# Patient Record
Sex: MALE | Race: WHITE | Employment: PART TIME | URBAN - METROPOLITAN AREA
[De-identification: names, ages, dates, MRNs, and addresses within clinical notes are randomized per-mention and may not be internally consistent; named-entity substitution may affect disease eponyms.]

---

## 2017-04-27 ENCOUNTER — HOSPITAL ENCOUNTER (OUTPATIENT)
Dept: ENDOSCOPY | Age: 32
Discharge: HOME | End: 2017-04-27
Attending: INTERNAL MEDICINE | Admitting: INTERNAL MEDICINE

## 2017-04-27 VITALS
RESPIRATION RATE: 16 BRPM | TEMPERATURE: 98.6 F | HEIGHT: 73 IN | DIASTOLIC BLOOD PRESSURE: 83 MMHG | WEIGHT: 174 LBS | HEART RATE: 108 BPM | OXYGEN SATURATION: 95 % | BODY MASS INDEX: 23.06 KG/M2 | SYSTOLIC BLOOD PRESSURE: 113 MMHG

## 2017-04-27 RX ORDER — FENTANYL CITRATE 50 UG/ML
50 INJECTION, SOLUTION INTRAMUSCULAR; INTRAVENOUS EVERY 5 MIN PRN
Status: DISCONTINUED | OUTPATIENT
Start: 2017-04-27 | End: 2017-04-28 | Stop reason: HOSPADM

## 2017-04-27 RX ORDER — ONDANSETRON 2 MG/ML
4 INJECTION INTRAMUSCULAR; INTRAVENOUS
Status: ACTIVE | OUTPATIENT
Start: 2017-04-27 | End: 2017-04-27

## 2017-04-27 RX ORDER — MEPERIDINE HYDROCHLORIDE 25 MG/ML
12.5 INJECTION INTRAMUSCULAR; INTRAVENOUS; SUBCUTANEOUS EVERY 5 MIN PRN
Status: DISCONTINUED | OUTPATIENT
Start: 2017-04-27 | End: 2017-04-28 | Stop reason: HOSPADM

## 2017-04-27 RX ORDER — OXYCODONE HYDROCHLORIDE AND ACETAMINOPHEN 5; 325 MG/1; MG/1
1 TABLET ORAL PRN
Status: ACTIVE | OUTPATIENT
Start: 2017-04-27 | End: 2017-04-27

## 2017-04-27 RX ORDER — SODIUM CHLORIDE 0.9 % (FLUSH) 0.9 %
10 SYRINGE (ML) INJECTION PRN
Status: DISCONTINUED | OUTPATIENT
Start: 2017-04-27 | End: 2017-04-28 | Stop reason: HOSPADM

## 2017-04-27 RX ORDER — MORPHINE SULFATE 2 MG/ML
2 INJECTION, SOLUTION INTRAMUSCULAR; INTRAVENOUS EVERY 5 MIN PRN
Status: DISCONTINUED | OUTPATIENT
Start: 2017-04-27 | End: 2017-04-28 | Stop reason: HOSPADM

## 2017-04-27 RX ORDER — SODIUM CHLORIDE 0.9 % (FLUSH) 0.9 %
10 SYRINGE (ML) INJECTION EVERY 12 HOURS SCHEDULED
Status: DISCONTINUED | OUTPATIENT
Start: 2017-04-27 | End: 2017-04-28 | Stop reason: HOSPADM

## 2017-04-27 RX ORDER — FENTANYL CITRATE 50 UG/ML
25 INJECTION, SOLUTION INTRAMUSCULAR; INTRAVENOUS EVERY 5 MIN PRN
Status: DISCONTINUED | OUTPATIENT
Start: 2017-04-27 | End: 2017-04-28 | Stop reason: HOSPADM

## 2017-04-27 RX ORDER — MORPHINE SULFATE 2 MG/ML
1 INJECTION, SOLUTION INTRAMUSCULAR; INTRAVENOUS EVERY 5 MIN PRN
Status: DISCONTINUED | OUTPATIENT
Start: 2017-04-27 | End: 2017-04-28 | Stop reason: HOSPADM

## 2017-04-27 RX ORDER — SODIUM CHLORIDE 9 MG/ML
INJECTION, SOLUTION INTRAVENOUS CONTINUOUS
Status: DISCONTINUED | OUTPATIENT
Start: 2017-04-27 | End: 2017-04-28 | Stop reason: HOSPADM

## 2017-04-27 RX ORDER — OXYCODONE HYDROCHLORIDE AND ACETAMINOPHEN 5; 325 MG/1; MG/1
2 TABLET ORAL PRN
Status: ACTIVE | OUTPATIENT
Start: 2017-04-27 | End: 2017-04-27

## 2017-04-27 RX ADMIN — SODIUM CHLORIDE: 9 INJECTION, SOLUTION INTRAVENOUS at 11:31

## 2017-04-27 ASSESSMENT — ENCOUNTER SYMPTOMS: SHORTNESS OF BREATH: 0

## 2017-04-27 ASSESSMENT — PAIN - FUNCTIONAL ASSESSMENT: PAIN_FUNCTIONAL_ASSESSMENT: 0-10

## 2017-04-27 ASSESSMENT — PAIN SCALES - GENERAL
PAINLEVEL_OUTOF10: 0

## 2020-12-04 ENCOUNTER — OFFICE VISIT (OUTPATIENT)
Dept: ORTHOPEDIC SURGERY | Age: 35
End: 2020-12-04
Payer: COMMERCIAL

## 2020-12-04 VITALS — HEIGHT: 73 IN | WEIGHT: 185 LBS | BODY MASS INDEX: 24.52 KG/M2

## 2020-12-04 PROCEDURE — G8420 CALC BMI NORM PARAMETERS: HCPCS | Performed by: ORTHOPAEDIC SURGERY

## 2020-12-04 PROCEDURE — 99203 OFFICE O/P NEW LOW 30 MIN: CPT | Performed by: ORTHOPAEDIC SURGERY

## 2020-12-04 PROCEDURE — 1036F TOBACCO NON-USER: CPT | Performed by: ORTHOPAEDIC SURGERY

## 2020-12-04 PROCEDURE — G8484 FLU IMMUNIZE NO ADMIN: HCPCS | Performed by: ORTHOPAEDIC SURGERY

## 2020-12-04 PROCEDURE — G8427 DOCREV CUR MEDS BY ELIG CLIN: HCPCS | Performed by: ORTHOPAEDIC SURGERY

## 2020-12-04 RX ORDER — METHYLPREDNISOLONE 4 MG/1
TABLET ORAL
Qty: 1 KIT | Refills: 0 | Status: SHIPPED | OUTPATIENT
Start: 2020-12-04

## 2020-12-04 NOTE — PROGRESS NOTES
No instability to varus and valgus stress testing. Negative milking maneuver. Negative biceps hook test    Skin: There are no rashes, ulcerations or lesions. Gait: Normal        Additional Comments:       Additional Examinations:         Left Upper Extremity: Examination of the left upper extremity does not show any tenderness, deformity or injury. Range of motion is unremarkable. There is no gross instability. There are no rashes, ulcerations or lesions. Strength and tone are normal.    Radiology:     X-rays obtained and reviewed in office:  Views 3 views of the right elbow demonstrates no obvious fracture dislocation or other osseous abnormalities    Assessment : Right elbow sprain with a flexor pronator mass strain    Impression:  Encounter Diagnoses   Name Primary?  Right elbow pain Yes    Sprain of right elbow, initial encounter        Office Procedures:  Orders Placed This Encounter   Procedures    XR ELBOW RIGHT (MIN 3 VIEWS)     Standing Status:   Future     Number of Occurrences:   1     Standing Expiration Date:   12/4/2021    Ambulatory referral to Physical Therapy     Referral Priority:   Routine     Referral Type:   Eval and Treat     Referral Reason:   Specialty Services Required     Number of Visits Requested:   1       Treatment Plan: I discussed the diagnosis and treatment options with him today. His pain focalizes over the medial side of the elbow directly at the flexor pronator mass insertion. Recommend at this time placing him on to a Medrol Dosepak and I am going to refer him to physical therapy. He is agreeable with this plan. We did discuss today if he feels at any point like this is getting to the point where it prevents him from doing his job that we could hold him out of work for some time.   I will see him back in 4 weeks to make sure he is getting improvement, if no improvement we may have to consider further imaging with an MRI

## 2020-12-07 ENCOUNTER — HOSPITAL ENCOUNTER (OUTPATIENT)
Dept: OCCUPATIONAL THERAPY | Age: 35
Setting detail: THERAPIES SERIES
Discharge: HOME | End: 2020-12-07
Payer: COMMERCIAL

## 2020-12-07 PROCEDURE — 97110 THERAPEUTIC EXERCISES: CPT | Performed by: OCCUPATIONAL THERAPIST

## 2020-12-07 PROCEDURE — 97535 SELF CARE MNGMENT TRAINING: CPT | Performed by: OCCUPATIONAL THERAPIST

## 2020-12-07 PROCEDURE — 97165 OT EVAL LOW COMPLEX 30 MIN: CPT | Performed by: OCCUPATIONAL THERAPIST

## 2020-12-07 PROCEDURE — 97140 MANUAL THERAPY 1/> REGIONS: CPT | Performed by: OCCUPATIONAL THERAPIST

## 2020-12-07 NOTE — PLAN OF CARE
[facility]  [address], [address] [facility], [address] [place] [address]  Phone: [phone] Fax: [phone]            Occupational [facility] Certification  Dear Referring Practitioner: Fermin Gary MD,     We had the pleasure of evaluating the following patient for occupational therapy services at [address] [place] [address]. A summary of our findings can be found in the initial assessment below. This includes our plan of care. If you have any questions or concerns regarding these findings, please do not hesitate to contact me at the office phone number checked above. Thank you for the referral.     Physician Signature:_______________________________Date:__________________  By signing above (or electronic signature), therapists plan is approved by physician      Patient: Helen Fernandez   : 1985   MRN: [id number]  Referring Physician: Referring Practitioner: Fermin Gary MD      Evaluation Date: 2020      Medical Diagnosis Information:  Diagnosis: O37.657R (ICD-10-CM) - Sprain of right elbow, initial encounter    Treatment Diagnosis: R elbow pain  M25.521              Insurance information: OT Insurance Information: [facility] choice plus  inciv deductible met. $ 1054 /$3000 indiv OOP met   $30 copay  100%    60OT/PT/ST      Date of Injury: 2 months ago fell down stairs at home  Date of Surgery: NA    Date of Patient follow up with Physician:     RESTRICTIONS/PRECAUTIONS:    Latex Allergy:  [x]No      []Yes  Pacemaker:  [x] No       [] Yes     Preferred Language for Healthcare:   [x]English       []other:     Functional Scale: 18% (Quick DASH)   Date assessed:  2020    C-SSRS Triggered by Intake questionnaire (Past 2 wk assessment):    No, Questionnaire did not trigger screening. SUBJECTIVE: Patient reported deficits/history of current problem:   Lucille Sox down stairs at home 2 months ago.   Fractured index finger per his diagnosis ,which he splinted himself. Scrapped elbow which started hurting a week later. Was prescribed a steroid by Dr. Ami Cid which has dulled the pain. Pain Scale: 7-8/10 prior to meds   [x]Constant      []Intermittent    [x]other: 4-5/10 with meds  Pain Location:  Med epic and down FA  Easing factors:steroid  Provocative factors: no comfortable position or movement    [x] Patient reported history, allergies, and medications reviewed - see intake form.        Occupational Profile:  Home Environment: lives with  [x] spouse,  [] family,  [] alone,  [] significant other,   [] other:    Occupation/School:  and     Recreational Activities/Meaningful Interests: working out    Prior Level of Function: [x] Independent with ADLs/IADLs     [] Assistance needed (describe):    Patient-Identified Primary Performance Deficits (to be addressed in POC):   [x] bathing    [x] household tasks - vacuum   [x] dressing-pulling    [] self feeding   [x] grooming    [x] work/education - lifting   [] functional mobility   [] sleeping/rest   [] toileting/hygiene   [x] recreational activities- working out   [x] driving    [] community/social participation   [x] other:  Discomfort with bathing, dressing, grooming, driving, cutting food    Comorbidities Affecting Functional Performance:     []Anxiety (F41.9)/Depression (F32.9)   []Diabetes Type 1(E10.65) or 2 (E11.65)   []Rheumatoid Arthritis (M05.9)  []Fibromyalgia (M79.7)  []Neuropathy(G60.9)  []Osteoarthritis(M19.91)  [x]None   []Other:    Hand Dominance:    [x]  Right    [] Left      OBJECTIVE:    12/7/20    AROM: Right Left   IF MP  PIP  DIP       LF MP  PIP  DIP       RF MP  PIP   DIP       SF MP  PIP  DIP       Digits: tips to DPFC           Thumb MP  IP     Thumb tip to DPFC     Wrist Ext/Flex            RD/UD WNL    Forearm sup/pron   WNL    Elbow ext/flex   WNL    Shoulder Flex                   Abd                   IR/ER          Edema: Strength:      II 58# 81#   Lateral Pinch     3 Point Pinch     Tip Pinch     MMT:   Wrist ext  Wrist flex  pron  sup       5/5  4/5  4+-5-/5  5/5      Observations (including splints, bandages, incisions, scars):       Sensation:  [] No reported deficits  [] Intact to light touch    [] [place] M[place] test completed, findings as noted:  [x] Other: reports episode of tingling from med FA down to hand ; burning posterior med elbow    Palpation: Pain with palpation to med epic    Functional Mobility/Transfers/Gait:  [x] Independent - no significant gait deviations  [] Assistance needed   [] Assistive device used: Falls Risk Assessment (30 days):   [] Falls Risk assessed and no intervention required. [] Falls Risk assessed and Patient requires intervention due to being higher risk   TUG score (>12s at risk):     [] Falls education provided, including      Review Of Systems (ROS): [x]Performed Review of systems (Integumentary, CardioPulmonary, Neurological) by intake and observation. Intake form has been scanned into medical record. Patient has been instructed to contact their primary care physician regarding ROS issues if not already being addressed at this time. ASSESSMENT:   This patient presents with signs and symptoms consistent with the medical diagnosis provided by the referring physician. Impairments (physical, cognitive and/or psychosocial):  [] Decreased mobility  [x] Weakness    [] Hypersensitivity   [x] Pain/tenderness   [] Edema/swelling   [] Decreased coordination (fine/gross motor)   [] Impaired body mechanics  [] Sensory loss  [] Loss of balance   [] Other:      Rehab Potential:   [] Excellent [x] Good [] Fair  [] Poor     Barriers affecting rehab potential:  []Age    []Lack of Motivation   []Co-Morbidities  []Cognitive Function  [x]Environmental/home/work barriers  []Other:     Tolerance of evaluation/treatment:    [] Excellent [x] Good [] Fair  [] Poor    PLAN OF CARE:  Interventions:   [x] Therapeutic Exercise [x] Therapeutic Activity    [x] Activities of Daily Living [x] Neuromuscular Re-education      [x] Patient Education  [x] Manual Therapy      [x] Modalities as needed, and not otherwise contraindicated, including: ultrasound,paraffin,moist heat/cold pack, electrical stimulation, contrast bath, iontophoresis  [x] Splinting    Frequency/Duration:  1-2 days per week for 4-6 weeks      GOALS:    Patient stated goal:Decrease pain during work and daily tasks  [] Progressing: [] Met: [] Not Met: [] Adjusted    Therapist goals for Patient:   Short Term Goals: To be achieved in: 2 weeks  1. Independent in HEP and progression per patient tolerance, in order to prevent re-injury. [] Progressing: [] Met: [] Not Met: [] Adjusted   2. Patient will have a decrease in pain to facilitate improvement in movement, function, and ADLs as indicated by Functional Deficits. [] Progressing: [] Met: [] Not Met: [] Adjusted    Long Term Goals to be achieved in 4-6 weeks (through 1/18/20 ), including patient directed goals to address patient identified performance deficits:  1) Pt to be independent in graded HEP progression with a good level of effort and compliance. [] Progressing: [] Met: [] Not Met: [] Adjusted   2) Pt to report a score of </= 14 % on the Quick DASH disability questionnaire for increased performance with carrying, moving, and handling objects. [] Progressing: [] Met: [] Not Met: [] Adjusted   3) Pt will demonstrate increased strength to R  within 10# of L and wrist flex and pron 5-/5 for improved independence with ADL and work tasks. [] Progressing: [] Met: [] Not Met: [] Adjusted   4) Pt will have a decrease in pain to 2-3/10 to facilitate performance with ADL and work tasks. .  [] Progressing: [] Met: [] Not Met: [] Adjusted        OCCUPATIONAL THERAPY EVALUATION COMPLEXITY JUSTIFICATION:    [x] An occupational profile and medical/therapy history, which includes:   [x] a brief history including medical and/or therapy records relating to the     presenting problem   [] an expanded review of medical and/or therapy records and additional review     of physical, cognitive or psychosocial history related to current functional    performance   [] an extensive additional review of review of medical and/or therapy records and physical, cognitive, or psychosocial history related to current    functional performance    [x] An assessment that identifies performance deficits (relating to physical, cognitive, or psychosocial skills) that result in activity limitations and/or participation restrictions:   [x] 1-3 performance deficits   [] 3-5 performance deficits   [] 5 or more performance deficits    [x] Clinical decision making of:   [x] low complexity, including analysis of occupational profile, data analysis from problem focused assessment, and consideration of a limited number of treatment options. No comorbidities affect occupational performance. No task modifications or assistance needed to complete evaluation. [] moderate complexity, including analysis of occupational profile, data analysis from detailed assessment and consideration of several treatment options. Comorbidities that affect occupational performance may be present. Minimal to moderate task modifications or assistance needed to complete assessment. [] high complexity, including analysis of occupational profile, analysis of data from comprehensive assessment and consideration of multiple treatment options. Multiple comorbidities present that affect occupational performance. Significant task modifications or assistance needed to complete assessment.     Evaluation Code:  [x] Low Complexity EVAL 10796 (typically 30 minutes face to face)  [] Mod Complexity EVAL 65529 (typically 45 minutes face to face)  [] High Complexity EVAL 03211 (typically 60 minutes face to face)    Electronically signed by: Veena Kiran  OTR/L [facility]  OT [id number]

## 2020-12-07 NOTE — FLOWSHEET NOTE
1100 [facility], [facility]  [address], [address] [place] [address], [address] [place] [address]  Phone: [phone] Fax: [phone]    Occupational Therapy Treatment Note/ Progress Report:     Date:  2020    Patient Name:  Sherry Vazquez    :  1985  MRN: [id number]    Medical/Treatment Diagnosis Information:  · Diagnosis: S53.401A (ICD-10-CM) - Sprain of right elbow, initial encounter   · Treatment Diagnosis: R elbow pain  G81.258     Insurance/Certification information:  OT Insurance Information: [facility] choice plus  inciv deductible met. $ 1054 /$3000 indiv OOP met   $30 copay  100%    60OT/PT/ST  Physician Information:  Referring Practitioner: Olivia Alaniz MD  Has the plan of care been signed (Y/N):        []  Yes  [x]  No       Visit # Insurance Allowable Auth Required   1 60 OT/PT/ST []  Yes []  No        Is this a Progress Report:     []  Yes  [x]  No      If Yes:  Date Range for reporting period:  Beginning 20  Ending    Progress report will be due (10 Rx or 30 days whichever is less): 19    Recertification will be due (POC Duration  / 90 days whichever is less):20    Date of Injury: 2 months ago  Date of Surgery:NA    Date of Patient follow up with Physician: 20    RESTRICTIONS/PRECAUTIONS:     Latex Allergy:  [x]No      []Yes  Pacemaker:  [x] No       [] Yes     Preferred Language for Healthcare:   [x]English       []other:     Functional Scale: 18% (Quick DASH)   Date assessed:  2020    SUBJECTIVE:   Patient reported deficits/history of current problem:   Milam Irma down stairs at home 2 months ago. Fractured index finger per his diagnosis ,which he splinted himself. Scrapped elbow which started hurting a week later.   Was prescribed a steroid by Dr. Gray Sandoval which has dulled the pain.         Pain Scale:  Initial : 7-8/10 prior to med/steroids     4-5/10 with meds  Pain is constant    OBJECTIVE:       Date:  2020     Objective Measures/Tests:      ROM: WNL           Strength:       II R 58#  L 81#     MMT:   Wrist ext  Wrist flex  pron  sup R  5/5  4/5  4+-5-/5  5/5           Observations: Other:                  MODALITIES:      Fluidotherapy (62658)      Estim (50554/29944)      Paraffin (46549)      US (09256)      Iontophoresis (75941)      Hot Pack      Cold Pack            INTERVENTIONS:      Therapeutic Exercise (58025) FA stretches  5 x 15 sec hold ea                             Therapeutic Activity (64376)                              Manual Therapy (19811) Hawks  to FA and posterior elbow  10'      K tape to R FA, elbow and posterior elbow to assist flex musculature , assist with pain and relieve ulnar n  pressure           Neuromuscular Reeducation (53525)                  ADL Training (44345) Instructed on diagnosis specific anatomy, joint protection, and ADL modifications                 HEP Training/Review See sheet(s)      Access Code: [email or web address]   URL: [email or web address]   Date: 12/07/2020   Prepared by: Duyen Kaiser     Exercises   Standing Wrist Flexion Stretch - 5 reps - 15 hold - 6x daily - 7x weekly   Standing Wrist Extension Stretch - 5 reps - 15 hold - 6x daily - 7x weekly              Splinting      Lcode:      Orthotic Mgmt, Subsequent Enc (28425)      Orthotic Mgmt & Training (90245)            Other:                                Therapeutic Exercise & NMR:  [x] (24196) Provided verbal/tactile cueing for activities related to strengthening, flexibility, endurance, ROM  for improvements in scapular, scapulothoracic and UE control with self care, reaching, carrying, lifting, house/yardwork, driving/computer work.     [x] (41494) Provided verbal/tactile cueing for activities related to improving balance, coordination, kinesthetic sense, posture, motor skill, proprioception  to assist with  scapular, scapulothoracic and UE control with self care, reaching, carrying, lifting, house/yardwork, driving/computer work.     Therapeutic Activities & NMR:    [] (82145 or 42140) Provided verbal/tactile cueing for activities related to improving balance, coordination, kinesthetic sense, posture, motor skill, proprioception and motor activation to allow for proper function of scapular, scapulothoracic and UE control with self care, carrying, lifting, driving/computer work    Home Exercise Program:    [x] (49809) Reviewed/Progressed HEP activities related to strengthening, flexibility, endurance, ROM of scapular, scapulothoracic and UE control with self care, reaching, carrying, lifting, house/yardwork, driving/computer work  [] (73090) Reviewed/Progressed HEP activities related to improving balance, coordination, kinesthetic sense, posture, motor skill, proprioception of scapular, scapulothoracic and UE control with self care, reaching, carrying, lifting, house/yardwork, driving/computer work      Manual Treatments:  PROM / STM / Oscillations-Mobs:  G-I, II, III, IV (PA's, Inf., Post.)  [x] (74920) Provided manual therapy to mobilize soft tissue/joints of cervical/CT, scapular GHJ and UE for the purpose of modulating pain, promoting relaxation,  increasing ROM, reducing/eliminating soft tissue swelling/inflammation/restriction, improving soft tissue extensibility and allowing for proper ROM for normal function with self care, reaching, carrying, lifting, house/yardwork, driving/computer work    ADL Training:  [x] (23552) Provided self-care/home management training related to activities of daily living and compensatory training, and/or use of adaptive equipment      Charges:  Timed Code Treatment Minutes: 40   Total Treatment Minutes: 55   Worker's Comp: Time In/Time Out     [x] EVAL (LOW) 50831 (typically 20 minutes face-to-face)    [] EVAL (MOD) 68146 (typically 30 minutes face-to-face)  [] EVAL (HIGH) 86988 (typically 45 minutes face-to-face)  [] OT Re-eval (87842)       [x] Amalia ([id number]) x      [] PLNLI(52282)  [] NMR (25201) x      [] Estim (attended) (49306)   [x] Manual (33823 [facility]) x      [] US (37015)  [] TA (00634) x      [] Paraffin (94121)  [x] ADL  (44343) x     [] Splint/L code:    [] Estim (unattended) 33 [phone] 31)  [] Fluidotherapy (12180)  [] Other:      ASSESSMENT:  See eval    Frequency/Duration:  1-2 days per week for 4-6 weeks        GOALS:    Patient stated goal:Decrease pain during work and daily tasks  []? Progressing: []? Met: []? Not Met: []? Adjusted     Therapist goals for Patient:   Short Term Goals: To be achieved in: 2 weeks  1. Independent in HEP and progression per patient tolerance, in order to prevent re-injury. []? Progressing: []? Met: []? Not Met: []? Adjusted   2. Patient will have a decrease in pain to facilitate improvement in movement, function, and ADLs as indicated by Functional Deficits. []? Progressing: []? Met: []? Not Met: []? Adjusted     Long Term Goals to be achieved in 4-6 weeks (through 1/18/20 ), including patient directed goals to address patient identified performance deficits:  1) Pt to be independent in graded HEP progression with a good level of effort and compliance. []? Progressing: []? Met: []? Not Met: []? Adjusted   2) Pt to report a score of </= 14 % on the Quick DASH disability questionnaire for increased performance with carrying, moving, and handling objects. []? Progressing: []? Met: []? Not Met: []? Adjusted   3) Pt will demonstrate increased strength to R  within 10# of L and wrist flex and pron 5-/5 for improved independence with ADL and work tasks. []? Progressing: []? Met: []? Not Met: []? Adjusted   4) Pt will have a decrease in pain to 2-3/10 to facilitate performance with ADL and work tasks. .  []? Progressing: []? Met: []? Not Met: []? Adjusted            Overall Progression Towards Functional Goals/Treatment Progress Update:  [] Patient is progressing as expected towards functional goals listed.     [] Progression is slowed due to complexities/impairments listed. [] Progression has been slowed due to co-morbidities. [x] Plan just implemented, too soon to assess goals progression <30 days  [] Goals require adjustment due to lack of progress  [] Patient is not progressing as expected and requires additional follow up with physician  [] All goals are met  [] Other:     Prognosis for POC: [x] Good [] Fair  [] Poor    Patient requires continued skilled intervention: [x] Yes  [] No    Treatment/Activity Tolerance:  [x] Patient able to complete treatment  [] Patient limited by fatigue  [] Patient limited by pain    [] Patient limited by other medical complications  [] Other:                  PLAN: See eval  [] Continue per plan of care [] Alter current plan (see comments above)  [x] Plan of care initiated [] Hold pending MD visit [] Discharge      Electronically signed by: Mandi Alston OTALAN/L CHT  OT [id number]    Note: If patient does not return for scheduled/ recommended follow up visits, this note will serve as a discharge from care along with most recent update on progress.

## 2020-12-14 ENCOUNTER — HOSPITAL ENCOUNTER (OUTPATIENT)
Dept: OCCUPATIONAL THERAPY | Age: 35
Setting detail: THERAPIES SERIES
Discharge: HOME | End: 2020-12-14
Payer: COMMERCIAL

## 2020-12-14 PROCEDURE — 97140 MANUAL THERAPY 1/> REGIONS: CPT | Performed by: OCCUPATIONAL THERAPIST

## 2020-12-14 PROCEDURE — MISCD83 BANDIT: Performed by: ORTHOPAEDIC SURGERY

## 2020-12-14 PROCEDURE — 97110 THERAPEUTIC EXERCISES: CPT | Performed by: OCCUPATIONAL THERAPIST

## 2020-12-14 PROCEDURE — 97112 NEUROMUSCULAR REEDUCATION: CPT | Performed by: OCCUPATIONAL THERAPIST

## 2020-12-14 NOTE — FLOWSHEET NOTE
[facility], [facility]  [address],  [address], [address]  Phone: [phone] Fax: [phone]    Occupational Therapy Treatment Note/ Progress Report:     Date:  2020    Patient Name:  Indira Camacho    :  1985  MRN: [id number]    Medical/Treatment Diagnosis Information:  · Diagnosis: S53.401A (ICD-10-CM) - Sprain of right elbow, initial encounter   · Treatment Diagnosis: R elbow pain  Z64.660     Insurance/Certification information:  OT Insurance Information: [facility] choice plus  inciv deductible met. $ 1054 /$3000 indiv OOP met   $30 copay  100%    60OT/PT/ST  Physician Information:  Referring Practitioner: Hardeep King MD  Has the plan of care been signed (Y/N):        []  Yes  [x]  No       Visit # Insurance Allowable Auth Required   2 60 OT/PT/ST []  Yes []  No        Is this a Progress Report:     []  Yes  [x]  No      If Yes:  Date Range for reporting period:  Beginning 20  Ending    Progress report will be due (10 Rx or 30 days whichever is less): 62    Recertification will be due (POC Duration  / 90 days whichever is less):20    Date of Injury: 2 months ago  Date of Surgery:NA    Date of Patient follow up with Physician: 20    RESTRICTIONS/PRECAUTIONS:     Latex Allergy:  [x]No      []Yes  Pacemaker:  [x] No       [] Yes     Preferred Language for Healthcare:   [x]English       []other:     Functional Scale: 18% (Quick DASH)   Date assessed:  2020    SUBJECTIVE: Debra Saha was the first day he had constant pain since first session. [place] k tape was helpful. Has not had any urning sensation except for yesterday. Patient reported deficits/history of current problem:   Janelle Degree down stairs at home 2 months ago. Fractured index finger per his diagnosis ,which he splinted himself. Scrapped elbow which started hurting a week later.   Was prescribed a steroid by Dr. Heidi Mccarty which has dulled the pain.    Pain Scale:  Initial : 7-8/10 prior to med/steroids     4-5/10 with meds  Pain is constant                        Current:  1-2/10  Except for yesterday   Yesterday 7-8/10  OBJECTIVE:       Date:  12/7/2020 12/14/20    Objective Measures/Tests:      ROM: WNL           Strength:       II R 58#  L 81# R 77#        With counterforce strap  R 90#      MMT:   Wrist ext  Wrist flex  pron  sup R  5/5  4/5  4+-5-/5  5/5 R  5/5  4+/5 pain 9/10  5-/5 pain 6/10          Observations: Other:                  MODALITIES:      Fluidotherapy (91194)      Estim (88543/50327)      Paraffin (34332)      US (99878)      Iontophoresis (77185)      Hot Pack      Cold Pack            INTERVENTIONS:      Therapeutic Exercise (11638) FA stretches  5 x 15 sec hold ea FA stretches  5 x 15 sec hold ea      flexbar green   Wrist ext  Sup  15x2        5#    ecc wrist flex   exx pron  15x2                      Therapeutic Activity (21254)                              Manual Therapy (69700) Hawks  to FA and posterior elbow  10' Hawks  to FA and posterior elbow  10     K tape to R FA, elbow and posterior elbow to assist flex musculature , assist with pain and relieve ulnar n  pressure           Neuromuscular Reeducation (98631)  Ulnar nerve glides  5x 15 sec hold                ADL Training (09553) Instructed on diagnosis specific anatomy, joint protection, and ADL modifications                 HEP Training/Review See sheet(s)      Access Code: [email or web address]   URL: [email or web address]   Date: 12/07/2020   Prepared by:  Duyen Kaiser     Exercises   Standing Wrist Flexion Stretch - 5 reps - 15 hold - 6x daily - 7x weekly   Standing Wrist Extension Stretch - 5 reps - 15 hold - 6x daily - 7x weekly    Added  Ulnar nerve glides            Splinting      Lcode:      Orthotic Mgmt, Subsequent Enc (55962)      Orthotic Mgmt & Training (75158) Other:  Explained DN if pt cont with HEP and feels he is not getting better                              Therapeutic Exercise & NMR:  [x] (99202) Provided verbal/tactile cueing for activities related to strengthening, flexibility, endurance, ROM  for improvements in scapular, scapulothoracic and UE control with self care, reaching, carrying, lifting, house/yardwork, driving/computer work. [x] (05259) Provided verbal/tactile cueing for activities related to improving balance, coordination, kinesthetic sense, posture, motor skill, proprioception  to assist with  scapular, scapulothoracic and UE control with self care, reaching, carrying, lifting, house/yardwork, driving/computer work.     Therapeutic Activities & NMR:    [] (58519 or 88022) Provided verbal/tactile cueing for activities related to improving balance, coordination, kinesthetic sense, posture, motor skill, proprioception and motor activation to allow for proper function of scapular, scapulothoracic and UE control with self care, carrying, lifting, driving/computer work    Home Exercise Program:    [x] (05446) Reviewed/Progressed HEP activities related to strengthening, flexibility, endurance, ROM of scapular, scapulothoracic and UE control with self care, reaching, carrying, lifting, house/yardwork, driving/computer work  [] (06542) Reviewed/Progressed HEP activities related to improving balance, coordination, kinesthetic sense, posture, motor skill, proprioception of scapular, scapulothoracic and UE control with self care, reaching, carrying, lifting, house/yardwork, driving/computer work      Manual Treatments:  PROM / STM / Oscillations-Mobs:  G-I, II, III, IV (PA's, Inf., Post.) [x] (30585) Provided manual therapy to mobilize soft tissue/joints of cervical/CT, scapular GHJ and UE for the purpose of modulating pain, promoting relaxation,  increasing ROM, reducing/eliminating soft tissue swelling/inflammation/restriction, improving soft tissue extensibility and allowing for proper ROM for normal function with self care, reaching, carrying, lifting, house/yardwork, driving/computer work    ADL Training:  [x] (64075) Provided self-care/home management training related to activities of daily living and compensatory training, and/or use of adaptive equipment      Charges:  Timed Code Treatment Minutes: 50   Total Treatment Minutes: 50   Worker's Comp: Time In/Time Out     [] EVAL (LOW) 31785 (typically 20 minutes face-to-face)    [] EVAL (MOD) 08567 (typically 30 minutes face-to-face)  [] EVAL (HIGH) 87170 (typically 45 minutes face-to-face)  [] OT Re-eval (12763)       [x] Amalia [phone]) x      [] FMFGI(20000)  [x] NMR (34631) x      [] Estim (attended) (32748)   [x] Manual ([id number]) x      [] US (05967)  [] TA (81611) x      [] Paraffin (76179)  [] ADL  (20327) x     [] Splint/L code:    [] Estim (unattended) [phone]  [] Fluidotherapy (36333)  [] Other:      ASSESSMENT:  Pt doing well . Added ulnar nerve glides due to burning. Also added concentric strengthening. Pt to return as needed. Frequency/Duration:  1-2 days per week for 4-6 weeks        GOALS:    Patient stated goal:Decrease pain during work and daily tasks  [x]? Progressing: []? Met: []? Not Met: []? Adjusted     Therapist goals for Patient:   Short Term Goals: To be achieved in: 2 weeks  1. Independent in HEP and progression per patient tolerance, in order to prevent re-injury. []? Progressing: [x]? Met: []? Not Met: []? Adjusted   2. Patient will have a decrease in pain to facilitate improvement in movement, function, and ADLs as indicated by Functional Deficits. []? Progressing: [x]? Met: []? Not Met: []?  Adjusted    Long Term Goals to be achieved in 4-6 weeks (through 1/18/20 ), including patient directed goals to address patient identified performance deficits:  1) Pt to be independent in graded HEP progression with a good level of effort and compliance. [x]? Progressing: []? Met: []? Not Met: []? Adjusted   2) Pt to report a score of </= 14 % on the Quick DASH disability questionnaire for increased performance with carrying, moving, and handling objects. [x]? Progressing: []? Met: []? Not Met: []? Adjusted   3) Pt will demonstrate increased strength to R  within 10# of L and wrist flex and pron 5-/5 for improved independence with ADL and work tasks. [x]? Progressing: []? Met: []? Not Met: []? Adjusted   4) Pt will have a decrease in pain to 2-3/10 to facilitate performance with ADL and work tasks. .  [x]? Progressing: []? Met: []? Not Met: []? Adjusted            Overall Progression Towards Functional Goals/Treatment Progress Update:  [x] Patient is progressing as expected towards functional goals listed. [] Progression is slowed due to complexities/impairments listed. [] Progression has been slowed due to co-morbidities. [] Plan just implemented, too soon to assess goals progression <30 days  [] Goals require adjustment due to lack of progress  [] Patient is not progressing as expected and requires additional follow up with physician  [] All goals are met  [] Other:     Prognosis for POC: [x] Good [] Fair  [] Poor    Patient requires continued skilled intervention: [x] Yes  [] No    Treatment/Activity Tolerance:  [x] Patient able to complete treatment  [] Patient limited by fatigue  [] Patient limited by pain    [] Patient limited by other medical complications  [] Other:                  PLAN: Pt to perform HEP and call as needed.   [x] Continue per plan of care [] Alter current plan (see comments above)  [] Plan of care initiated [] Hold pending MD visit [] Discharge Electronically signed by: [id number] [facility] OTR/L [facility]  OT [id number]    Note: If patient does not return for scheduled/ recommended follow up visits, this note will serve as a discharge from care along with most recent update on progress.

## 2021-01-05 ENCOUNTER — OFFICE VISIT (OUTPATIENT)
Dept: ORTHOPEDIC SURGERY | Age: 36
End: 2021-01-05
Payer: COMMERCIAL

## 2021-01-05 DIAGNOSIS — S53.401A SPRAIN OF RIGHT ELBOW, INITIAL ENCOUNTER: Primary | ICD-10-CM

## 2021-01-05 PROCEDURE — 1036F TOBACCO NON-USER: CPT | Performed by: ORTHOPAEDIC SURGERY

## 2021-01-05 PROCEDURE — G8427 DOCREV CUR MEDS BY ELIG CLIN: HCPCS | Performed by: ORTHOPAEDIC SURGERY

## 2021-01-05 PROCEDURE — G8420 CALC BMI NORM PARAMETERS: HCPCS | Performed by: ORTHOPAEDIC SURGERY

## 2021-01-05 PROCEDURE — G8484 FLU IMMUNIZE NO ADMIN: HCPCS | Performed by: ORTHOPAEDIC SURGERY

## 2021-01-05 PROCEDURE — 99213 OFFICE O/P EST LOW 20 MIN: CPT | Performed by: ORTHOPAEDIC SURGERY

## 2021-01-05 NOTE — PROGRESS NOTES
Chief Complaint    Follow-up (left elbow)      History of Present Illness:  Cora Barrera is a 28 y.o. male. Follow-up for his right elbow. He has been treated for flexor pronator mass strain. Continues to have pain primarily over the medial side of the elbow. He has been doing physical therapy. Feels like his pain has improved somewhat but still persistently painful with activity       Medical History:  Patient's medications, allergies, past medical, surgical, social and family histories were reviewed and updated as appropriate. Review of Systems:  Pertinent items are noted in HPI  Review of systems reviewed from Patient History Form dated on 1/5/21 and available in the patient's chart under the Media tab. Vital Signs: There were no vitals taken for this visit. General Exam:   Constitutional: Patient is adequately groomed with no evidence of malnutrition  DTRs: Deep tendon reflexes are intact  Mental Status: The patient is oriented to time, place and person. The patient's mood and affect are appropriate. Hand Examination:    Inspection: No significant swelling erythema or ecchymosis noted about the right arm today     Palpation: There is tenderness palpation primarily over the medial epicondyle. No tenderness along the shoulder. No tenderness over the olecranon     Range of Motion: He does have full range of motion of his right elbow. Full range of motion out of his right shoulder     Strength: He does have decreased  strength in the right hand. There is pain and weakness noted with resisted pronation of the forearm.     Special Tests: No instability to varus and valgus stress testing. Negative milking maneuver. Negative biceps hook test     Skin: There are no rashes, ulcerations or lesions.     Gait: Normal    Additional Comments:       Additional Examinations:         Left Upper Extremity: Examination of the left upper extremity does not show any tenderness, deformity or injury. Range of motion is unremarkable. There is no gross instability. There are no rashes, ulcerations or lesions. Strength and tone are normal.          Assessment : Right elbow flexor pronator mass strain    Impression:  Encounter Diagnosis   Name Primary?  Sprain of right elbow, initial encounter Yes       Office Procedures:  No orders of the defined types were placed in this encounter. Treatment Plan: Discussed diagnosis and treatment options with him today. He is going to continue with physical therapy. He is going to start into some dry needling therapy. He is also using a tennis elbow band.   He is going to call us if no improvement over the next 6 to 8 weeks and at that point time we would obtain an MRI of the right elbow

## 2021-01-14 ENCOUNTER — HOSPITAL ENCOUNTER (OUTPATIENT)
Dept: OCCUPATIONAL THERAPY | Age: 36
Setting detail: THERAPIES SERIES
Discharge: HOME | End: 2021-01-14
Payer: COMMERCIAL

## 2021-01-14 PROCEDURE — 97140 MANUAL THERAPY 1/> REGIONS: CPT | Performed by: OCCUPATIONAL THERAPIST

## 2021-01-14 PROCEDURE — 97035 APP MDLTY 1+ULTRASOUND EA 15: CPT | Performed by: OCCUPATIONAL THERAPIST

## 2021-01-14 PROCEDURE — 97110 THERAPEUTIC EXERCISES: CPT | Performed by: OCCUPATIONAL THERAPIST

## 2021-01-14 NOTE — FLOWSHEET NOTE
[facility]  [address], [address] [facility], [address] [place] [address]  Phone: [phone] Fax: [phone]    Occupational Therapy Treatment Note/ Progress Report:     Date:  2021    Patient Name:  Edwin Mcnulty    :  1985  MRN: [id number]    Medical/Treatment Diagnosis Information:  · Diagnosis: S53.401A (ICD-10-CM) - Sprain of right elbow, initial encounter   · Treatment Diagnosis: R elbow pain  P88.157     Insurance/Certification information:  OT Insurance Information: [facility] choice plus  inciv deductible met. $ 1054 /$3000 indiv OOP met   $30 copay  100%    60OT/PT/ST  Physician Information:  Referring Practitioner: Bev No MD  Has the plan of care been signed (Y/N):        []  Yes  [x]  No       Visit # Insurance Allowable Auth Required   1 60 OT/PT/ST []  Yes []  No        Is this a Progress Report:     []  Yes  [x]  No      If Yes:  Date Range for reporting period:  Beginning 20  Ending    Progress report will be due (10 Rx or 30 days whichever is less): 43    Recertification will be due (POC Duration  / 90 days whichever is less):20    Date of Injury: 2 months ago  Date of Surgery:NA    Date of Patient follow up with Physician:     RESTRICTIONS/PRECAUTIONS:     Latex Allergy:  [x]No      []Yes  Pacemaker:  [x] No       [] Yes     Preferred Language for Healthcare:   [x]English       []other:     Functional Scale: 18% (Quick DASH)   Date assessed:  2020    SUBJECTIVE: Returns for therapy following recent visit with doctor. States not much pian today, but has had pain this week, sometimes at rest, or with activity. Has been using ice at home, but increases pain. Started new job today.     Patient reported deficits/history of current problem: Standing Wrist Flexion Stretch - 5 reps - 15 hold - 6x daily - 7x weekly   Standing Wrist Extension Stretch - 5 reps - 15 hold - 6x daily - 7x weekly    Added  Ulnar nerve glides   Pt to hold use of CF brace due to pain, use heat at home nightly         Splinting      Lcode:      Orthotic Mgmt, Subsequent Enc (39394)      Orthotic Mgmt & Training (63994)            Other:  Explained DN if pt cont with HEP and feels he is not getting better                              Therapeutic Exercise & NMR:  [x] (30491) Provided verbal/tactile cueing for activities related to strengthening, flexibility, endurance, ROM  for improvements in scapular, scapulothoracic and UE control with self care, reaching, carrying, lifting, house/yardwork, driving/computer work. [x] (26978) Provided verbal/tactile cueing for activities related to improving balance, coordination, kinesthetic sense, posture, motor skill, proprioception  to assist with  scapular, scapulothoracic and UE control with self care, reaching, carrying, lifting, house/yardwork, driving/computer work.     Therapeutic Activities & NMR:    [] (48360 or 50993) Provided verbal/tactile cueing for activities related to improving balance, coordination, kinesthetic sense, posture, motor skill, proprioception and motor activation to allow for proper function of scapular, scapulothoracic and UE control with self care, carrying, lifting, driving/computer work    Home Exercise Program:    [x] (16319) Reviewed/Progressed HEP activities related to strengthening, flexibility, endurance, ROM of scapular, scapulothoracic and UE control with self care, reaching, carrying, lifting, house/yardwork, driving/computer work  [] (17828) Reviewed/Progressed HEP activities related to improving balance, coordination, kinesthetic sense, posture, motor skill, proprioception of scapular, scapulothoracic and UE control with self care, reaching, carrying, lifting, house/yardwork, driving/computer work Manual Treatments:  PROM / STM / Oscillations-Mobs:  G-I, II, III, IV (PA's, Inf., Post.)  [x] (79259) Provided manual therapy to mobilize soft tissue/joints of cervical/CT, scapular GHJ and UE for the purpose of modulating pain, promoting relaxation,  increasing ROM, reducing/eliminating soft tissue swelling/inflammation/restriction, improving soft tissue extensibility and allowing for proper ROM for normal function with self care, reaching, carrying, lifting, house/yardwork, driving/computer work    ADL Training:  [x] (61097) Provided self-care/home management training related to activities of daily living and compensatory training, and/or use of adaptive equipment      Charges:  Timed Code Treatment Minutes: 40   Total Treatment Minutes: 48   Worker's Comp: Time In/Time Out     [] EVAL (LOW) 01235 (typically 20 minutes face-to-face)    [] EVAL (MOD) 57662 (typically 30 minutes face-to-face)  [] EVAL (HIGH) 83644 (typically 45 minutes face-to-face)  [] OT Re-eval (29382)       [x] Amalia [phone]) x 1     [] QNFLU(53495)  [x] NMR (43163) x      [] Estim (attended) (48461)   [x] Manual ([phone]) x 1     [x] US (46733)  [] TA (56314) x      [] Paraffin (23989)  [] ADL  (47738) x     [] Splint/L code:    [] Estim (unattended) ([phone])  [] Fluidotherapy (01519)  [] Other:      ASSESSMENT: Will monitor for pain-specific positioning or activities. DN as needed    Frequency/Duration:  1-2 days per week for 4-6 weeks        GOALS:    Patient stated goal:Decrease pain during work and daily tasks  [x]? Progressing: []? Met: []? Not Met: []? Adjusted     Therapist goals for Patient:   Short Term Goals: To be achieved in: 2 weeks  1. Independent in HEP and progression per patient tolerance, in order to prevent re-injury. []? Progressing: [x]? Met: []? Not Met: []? Adjusted   2. Patient will have a decrease in pain to facilitate improvement in movement, function, and ADLs as indicated by Functional Deficits. []? Progressing: [x]? Met: []? Not Met: []? Adjusted     Long Term Goals to be achieved in 4-6 weeks (through 1/18/20 ), including patient directed goals to address patient identified performance deficits:  1) Pt to be independent in graded HEP progression with a good level of effort and compliance. [x]? Progressing: []? Met: []? Not Met: []? Adjusted   2) Pt to report a score of </= 14 % on the Quick DASH disability questionnaire for increased performance with carrying, moving, and handling objects. [x]? Progressing: []? Met: []? Not Met: []? Adjusted   3) Pt will demonstrate increased strength to R  within 10# of L and wrist flex and pron 5-/5 for improved independence with ADL and work tasks. [x]? Progressing: []? Met: []? Not Met: []? Adjusted   4) Pt will have a decrease in pain to 2-3/10 to facilitate performance with ADL and work tasks. .  [x]? Progressing: []? Met: []? Not Met: []? Adjusted            Overall Progression Towards Functional Goals/Treatment Progress Update:  [x] Patient is progressing as expected towards functional goals listed. [] Progression is slowed due to complexities/impairments listed. [] Progression has been slowed due to co-morbidities.   [] Plan just implemented, too soon to assess goals progression <30 days  [] Goals require adjustment due to lack of progress  [] Patient is not progressing as expected and requires additional follow up with physician  [] All goals are met  [] Other:     Prognosis for POC: [x] Good [] Fair  [] Poor    Patient requires continued skilled intervention: [x] Yes  [] No    Treatment/Activity Tolerance:  [x] Patient able to complete treatment  [] Patient limited by fatigue  [] Patient limited by pain    [] Patient limited by other medical complications  [] Other:                  PLAN:   [x] Continue per plan of care [] Alter current plan (see comments above)  [] Plan of care initiated [] Hold pending MD visit [] Discharge Electronically signed by:  Teresia Osgood OTR/L, CHT [id number]      Note: If patient does not return for scheduled/ recommended follow up visits, this note will serve as a discharge from care along with most recent update on progress.

## 2021-01-28 ENCOUNTER — HOSPITAL ENCOUNTER (OUTPATIENT)
Dept: OCCUPATIONAL THERAPY | Age: 36
Setting detail: THERAPIES SERIES
Discharge: HOME | End: 2021-01-28
Payer: COMMERCIAL

## 2021-01-28 PROCEDURE — 97035 APP MDLTY 1+ULTRASOUND EA 15: CPT | Performed by: OCCUPATIONAL THERAPIST

## 2021-01-28 PROCEDURE — 97140 MANUAL THERAPY 1/> REGIONS: CPT | Performed by: OCCUPATIONAL THERAPIST

## 2021-01-28 PROCEDURE — 97110 THERAPEUTIC EXERCISES: CPT | Performed by: OCCUPATIONAL THERAPIST

## 2021-01-28 NOTE — FLOWSHEET NOTE
1. Independent in HEP and progression per patient tolerance, in order to prevent re-injury. []? Progressing: [x]? Met: []? Not Met: []? Adjusted   2. Patient will have a decrease in pain to facilitate improvement in movement, function, and ADLs as indicated by Functional Deficits. []? Progressing: [x]? Met: []? Not Met: []? Adjusted     Long Term Goals to be achieved in 4-6 weeks (through 1/18/20 ), including patient directed goals to address patient identified performance deficits:  1) Pt to be independent in graded HEP progression with a good level of effort and compliance. [x]? Progressing: []? Met: []? Not Met: []? Adjusted   2) Pt to report a score of </= 14 % on the Quick DASH disability questionnaire for increased performance with carrying, moving, and handling objects. [x]? Progressing: []? Met: []? Not Met: []? Adjusted   3) Pt will demonstrate increased strength to R  within 10# of L and wrist flex and pron 5-/5 for improved independence with ADL and work tasks. [x]? Progressing: []? Met: []? Not Met: []? Adjusted   4) Pt will have a decrease in pain to 2-3/10 to facilitate performance with ADL and work tasks. .  [x]? Progressing: []? Met: []? Not Met: []? Adjusted            Overall Progression Towards Functional Goals/Treatment Progress Update:  [x] Patient is progressing as expected towards functional goals listed. [] Progression is slowed due to complexities/impairments listed. [] Progression has been slowed due to co-morbidities.   [] Plan just implemented, too soon to assess goals progression <30 days  [] Goals require adjustment due to lack of progress  [] Patient is not progressing as expected and requires additional follow up with physician  [] All goals are met  [] Other:     Prognosis for POC: [x] Good [] Fair  [] Poor    Patient requires continued skilled intervention: [x] Yes  [] No    Treatment/Activity Tolerance: [x] Patient able to complete treatment  [] Patient limited by fatigue  [] Patient limited by pain    [] Patient limited by other medical complications  [] Other:                  PLAN:   [x] Continue per plan of care [] Alter current plan (see comments above)  [] Plan of care initiated [] Hold pending MD visit [] Discharge      Electronically signed by:  Trisha Du OTR/L, [facility]      Note: If patient does not return for scheduled/ recommended follow up visits, this note will serve as a discharge from care along with most recent update on progress.

## 2021-02-04 ENCOUNTER — HOSPITAL ENCOUNTER (OUTPATIENT)
Dept: OCCUPATIONAL THERAPY | Age: 36
Setting detail: THERAPIES SERIES
Discharge: HOME | End: 2021-02-04
Payer: COMMERCIAL

## 2021-02-04 PROCEDURE — 97110 THERAPEUTIC EXERCISES: CPT | Performed by: OCCUPATIONAL THERAPIST

## 2021-02-04 PROCEDURE — 97140 MANUAL THERAPY 1/> REGIONS: CPT | Performed by: OCCUPATIONAL THERAPIST

## 2021-02-04 PROCEDURE — 97035 APP MDLTY 1+ULTRASOUND EA 15: CPT | Performed by: OCCUPATIONAL THERAPIST

## 2021-02-04 NOTE — FLOWSHEET NOTE
1100 [facility]  [address], [address] [address], [address]  Phone: [phone] Fax: [phone]    Occupational Therapy Treatment Note/ Progress Report:     Date:  2021    Patient Name:  Padmini Elkins    :  1985  MRN: [id number]    Medical/Treatment Diagnosis Information:  · Diagnosis: S53.401A (ICD-10-CM) - Sprain of right elbow, initial encounter   · Treatment Diagnosis: R elbow pain  T57.942     Insurance/Certification information:  OT Insurance Information: [facility] choice plus  inciv deductible met. $ 1054 /$3000 indiv OOP met   $30 copay  100%    60OT/PT/ST  Physician Information:  Referring Practitioner: Yoshi Rodarte MD  Has the plan of care been signed (Y/N):        []  Yes  [x]  No       Visit # Insurance Allowable Auth Required   3 60 OT/PT/ST []  Yes []  No        Is this a Progress Report:     []  Yes  [x]  No      If Yes:  Date Range for reporting period:  Beginning 20  Ending    Progress report will be due (10 Rx or 30 days whichever is less): 72    Recertification will be due (POC Duration  / 90 days whichever is less):20    Date of Injury: 2 months ago  Date of Surgery:NA    Date of Patient follow up with Physician: as needed    RESTRICTIONS/PRECAUTIONS:     Latex Allergy:  [x]No      []Yes  Pacemaker:  [x] No       [] Yes     Preferred Language for Healthcare:   [x]English       []other:     Functional Scale: 18% (Quick DASH)   Date assessed:  2020    SUBJECTIVE: Compressive sleeve helping at night. Dull pain at medial elbow continues during day. Did not hear from doctor regarding ionto. Returns for therapy following recent visit with doctor. States not much pain today, but has had pain this week, sometimes at rest, or with activity. Has been using ice at home, but increases pain. Started new job today.     Patient reported deficits/history of current problem: Timmy Dickson down stairs at home 2 months ago. Fractured index finger per his diagnosis ,which he splinted himself. Scrapped elbow which started hurting a week later. Was prescribed a steroid by Dr. Ely Montano which has dulled the pain.         Pain Scale:  Initial : 7-8/10 prior to med/steroids     4-5/10 with meds  Pain is constant                        Current:  1-2/10  Except for yesterday   Yesterday 7-8/10  OBJECTIVE:       Date:  12/7/2020 12/14/20 1/14/21 1/28/21 2/4/21   Objective Measures/Tests:        ROM: WNL               Strength:         II R 58#  L 81# R 77#        With counterforce strap  R 90#   R96# (pain free)     MMT:   Wrist ext  Wrist flex  pron  sup R  5/5  4/5  4+-5-/5  5/5 R  5/5  4+/5 pain 9/10  5-/5 pain 6/10              Observations:          Other:                        MODALITIES:        Fluidotherapy (10507)        Estim (48906/03888)        Paraffin (34050)        US (16213)   8' medial elbow     Iontophoresis (62952)        Hot Pack   8'     Cold Pack                INTERVENTIONS:        Therapeutic Exercise (20052) FA stretches  5 x 15 sec hold ea FA stretches  5 x 15 sec hold ea        flexbar green   Wrist ext  Sup  15x2        5#    ecc wrist flex   exx pron  15x2      Flexbar    Red, flex twist x20  Bend x20 Red flex x20  Ext x20  Bend x20   Eccentric strength     3# x10   Putty stabs     Red, wide grasp 5'   Therapeutic Activity (27004)                                        Manual Therapy (19387) Hawks  to FA and posterior elbow  10' Hawks  to FA and posterior elbow  10 Flexor mass massage and stretch Flexor mass massage, stretch Flexor mass stretch    K tape to R FA, elbow and posterior elbow to assist flex musculature , assist with pain and relieve ulnar n  pressure               Neuromuscular Reeducation (89391)  Ulnar nerve glides  5x 15 sec hold ADL Training (61171) Instructed on diagnosis specific anatomy, joint protection, and ADL modifications                       HEP Training/Review See sheet(s)  Forearm stretches      Access Code: [email or web address]   URL: [email or web address]   Date: 12/07/2020   Prepared by: Duyen Kaiser     Exercises   Standing Wrist Flexion Stretch - 5 reps - 15 hold - 6x daily - 7x weekly   Standing Wrist Extension Stretch - 5 reps - 15 hold - 6x daily - 7x weekly    Added  Ulnar nerve glides   Pt to hold use of CF brace due to pain, use heat at home nightly Try CF brace now that musculature is non-tender            Splinting        Lcode:        Orthotic Mgmt, Subsequent Enc (96437)        Orthotic Mgmt & Training (27030)                Other:  Explained DN if pt cont with HEP and feels he is not getting better  Issued tensogrip D                                      Therapeutic Exercise & NMR:  [x] (38441) Provided verbal/tactile cueing for activities related to strengthening, flexibility, endurance, ROM  for improvements in scapular, scapulothoracic and UE control with self care, reaching, carrying, lifting, house/yardwork, driving/computer work. [x] (40386) Provided verbal/tactile cueing for activities related to improving balance, coordination, kinesthetic sense, posture, motor skill, proprioception  to assist with  scapular, scapulothoracic and UE control with self care, reaching, carrying, lifting, house/yardwork, driving/computer work.     Therapeutic Activities & NMR:    [] (22514 or 62867) Provided verbal/tactile cueing for activities related to improving balance, coordination, kinesthetic sense, posture, motor skill, proprioception and motor activation to allow for proper function of scapular, scapulothoracic and UE control with self care, carrying, lifting, driving/computer work    Home Exercise Program: [x] (63007) Reviewed/Progressed HEP activities related to strengthening, flexibility, endurance, ROM of scapular, scapulothoracic and UE control with self care, reaching, carrying, lifting, house/yardwork, driving/computer work  [] (56937) Reviewed/Progressed HEP activities related to improving balance, coordination, kinesthetic sense, posture, motor skill, proprioception of scapular, scapulothoracic and UE control with self care, reaching, carrying, lifting, house/yardwork, driving/computer work      Manual Treatments:  PROM / STM / Oscillations-Mobs:  G-I, II, III, IV (PA's, Inf., Post.)  [x] (46969) Provided manual therapy to mobilize soft tissue/joints of cervical/CT, scapular GHJ and UE for the purpose of modulating pain, promoting relaxation,  increasing ROM, reducing/eliminating soft tissue swelling/inflammation/restriction, improving soft tissue extensibility and allowing for proper ROM for normal function with self care, reaching, carrying, lifting, house/yardwork, driving/computer work    ADL Training:  [x] (38011) Provided self-care/home management training related to activities of daily living and compensatory training, and/or use of adaptive equipment      Charges:  Timed Code Treatment Minutes: 40   Total Treatment Minutes: 40   Worker's Comp: Time In/Time Out     [] EVAL (LOW) 56201 (typically 20 minutes face-to-face)    [] EVAL (MOD) 72192 (typically 30 minutes face-to-face)  [] EVAL (HIGH) 31715 (typically 45 minutes face-to-face)  [] OT Re-eval (89389)       [x] Amalia [phone] x 1     [] YROYP(87987)  [] NMR (67608) x      [] Estim (attended) (88883)   [x] Manual ([id number]) x 1     [x] US (09213)  [] TA (35482) x      [] Paraffin (38517)  [] ADL  (04124) x     [] Splint/L code:    [] Estim (unattended) ([phone])  [] Fluidotherapy (25705)  [] Other:      ASSESSMENT: Will monitor for pain-specific positioning or activities    Frequency/Duration:  1-2 days per week for 4-6 weeks        GOALS: Patient stated goal:Decrease pain during work and daily tasks  [x]? Progressing: []? Met: []? Not Met: []? Adjusted     Therapist goals for Patient:   Short Term Goals: To be achieved in: 2 weeks  1. Independent in HEP and progression per patient tolerance, in order to prevent re-injury. []? Progressing: [x]? Met: []? Not Met: []? Adjusted   2. Patient will have a decrease in pain to facilitate improvement in movement, function, and ADLs as indicated by Functional Deficits. []? Progressing: [x]? Met: []? Not Met: []? Adjusted     Long Term Goals to be achieved in 4-6 weeks (through 1/18/20 ), including patient directed goals to address patient identified performance deficits:  1) Pt to be independent in graded HEP progression with a good level of effort and compliance. [x]? Progressing: []? Met: []? Not Met: []? Adjusted   2) Pt to report a score of </= 14 % on the Quick DASH disability questionnaire for increased performance with carrying, moving, and handling objects. [x]? Progressing: []? Met: []? Not Met: []? Adjusted   3) Pt will demonstrate increased strength to R  within 10# of L and wrist flex and pron 5-/5 for improved independence with ADL and work tasks. [x]? Progressing: []? Met: []? Not Met: []? Adjusted   4) Pt will have a decrease in pain to 2-3/10 to facilitate performance with ADL and work tasks. .  [x]? Progressing: []? Met: []? Not Met: []? Adjusted            Overall Progression Towards Functional Goals/Treatment Progress Update:  [x] Patient is progressing as expected towards functional goals listed. [] Progression is slowed due to complexities/impairments listed. [] Progression has been slowed due to co-morbidities.   [] Plan just implemented, too soon to assess goals progression <30 days  [] Goals require adjustment due to lack of progress  [] Patient is not progressing as expected and requires additional follow up with physician  [] All goals are met  [] Other: Prognosis for POC: [x] Good [] Fair  [] Poor    Patient requires continued skilled intervention: [x] Yes  [] No    Treatment/Activity Tolerance:  [x] Patient able to complete treatment  [] Patient limited by fatigue  [] Patient limited by pain    [] Patient limited by other medical complications  [] Other:                  PLAN:   [x] Continue per plan of care [] Alter current plan (see comments above)  [] Plan of care initiated [] Hold pending MD visit [] Discharge      Electronically signed by:  Jolly Jones OTR/L, [address] [place] [address]      Note: If patient does not return for scheduled/ recommended follow up visits, this note will serve as a discharge from care along with most recent update on progress.

## 2021-02-15 ENCOUNTER — HOSPITAL ENCOUNTER (OUTPATIENT)
Dept: OCCUPATIONAL THERAPY | Age: 36
Setting detail: THERAPIES SERIES
Discharge: HOME | End: 2021-02-15
Payer: COMMERCIAL

## 2021-02-15 NOTE — FLOWSHEET NOTE
1100 [facility]  [address], [address] [facility], [address] [place] [address]  Phone: [phone] Fax: [phone]      Occupational Therapy  Cancellation/No-show Note  Patient Name:  Josue Ortega  :  1985   Date:  2/15/2021    Cancelled visits to date: 0  No-shows to date: 0    For today's appointment patient:  []  Cancelled  [x]  Rescheduled appointment  []  No-show     Reason given by patient:  []  Patient ill  []  Conflicting appointment  []  No transportation    []  Conflict with work  []  No reason given  [x]  Other: Weather    Comments:      Phone call information:   []  Phone call made today to patient at _ time at number provided:      []  Patient answered, conversation as follows:    []  Patient did not answer, message left as follows:  []  Phone call not made today  []  Phone call not needed - pt contacted us to cancel and provided reason for cancellation.      Electronically signed by:  KATE Mckeon/NAYELI, CHT

## 2021-02-17 ENCOUNTER — HOSPITAL ENCOUNTER (OUTPATIENT)
Dept: OCCUPATIONAL THERAPY | Age: 36
Setting detail: THERAPIES SERIES
Discharge: HOME | End: 2021-02-17
Payer: COMMERCIAL

## 2021-02-17 PROCEDURE — 97110 THERAPEUTIC EXERCISES: CPT | Performed by: OCCUPATIONAL THERAPIST

## 2021-02-17 PROCEDURE — 97035 APP MDLTY 1+ULTRASOUND EA 15: CPT | Performed by: OCCUPATIONAL THERAPIST

## 2021-02-17 NOTE — FLOWSHEET NOTE
[facility] [address], [address] [facility], [address] [place] [address]  Phone: [phone] Fax: [phone]    Occupational Therapy Treatment Note/ Progress Report:     Date:  2021    Patient Name:  Cora Barrera    :  1985  MRN: [id number]    Medical/Treatment Diagnosis Information:  · Diagnosis: S53.401A (ICD-10-CM) - Sprain of right elbow, initial encounter   · Treatment Diagnosis: R elbow pain  B29.452     Insurance/Certification information:  OT Insurance Information: [facility] choice plus  inciv deductible met. $ 1054 /$3000 indiv OOP met   $30 copay  100%    60OT/PT/ST  Physician Information:  Referring Practitioner: Larry Schwab MD  Has the plan of care been signed (Y/N):        []  Yes  [x]  No       Visit # Insurance Allowable Auth Required   4 60 OT/PT/ST []  Yes []  No        Is this a Progress Report:     []  Yes  [x]  No      If Yes:  Date Range for reporting period:  Beginning 20  Ending    Progress report will be due (10 Rx or 30 days whichever is less): 36    Recertification will be due (POC Duration  / 90 days whichever is less):20    Date of Injury: 2 months ago  Date of Surgery:NA    Date of Patient follow up with Physician: as needed    RESTRICTIONS/PRECAUTIONS:     Latex Allergy:  [x]No      []Yes  Pacemaker:  [x] No       [] Yes     Preferred Language for Healthcare:   [x]English       []other:     Functional Scale: 18% (Quick DASH)   Date assessed:  2020    SUBJECTIVE: \"Today is first day I have not had pain\" (pt surprised by this due to moving this week; has had pain every day this week up until now). Returns for therapy following recent visit with doctor. States not much pain today, but has had pain this week, sometimes at rest, or with activity. Has been using ice at home, but increases pain. Started new job today.     Patient reported deficits/history of current problem: Marley Vaughanba down stairs at home 2 months ago. Fractured index finger per his diagnosis ,which he splinted himself. Scrapped elbow which started hurting a week later. Was prescribed a steroid by Dr. Wayne Castañeda which has dulled the pain.         Pain Scale:  Initial : 7-8/10 prior to med/steroids     4-5/10 with meds  Pain is constant                        Current:  0/10    Yesterday 7-8/10  OBJECTIVE:       Date:  12/7/2020 12/14/20 1/14/21 1/28/21 2/4/21 2/17/21   Objective Measures/Tests:         ROM: WNL                 Strength:          II R 58#  L 81# R 77#        With counterforce strap  R 90#   R96# (pain free)   85# pain free   MMT:   Wrist ext  Wrist flex  pron  sup R  5/5  4/5  4+-5-/5  5/5 R  5/5  4+/5 pain 9/10  5-/5 pain 6/10                Observations:           Other:                           MODALITIES:         Fluidotherapy (31259)         Estim (28433/15023)         Paraffin (80791)         US (77181)   8' medial elbow   8' medial elbow   Iontophoresis (74306)         Hot Pack   8'      Cold Pack                  INTERVENTIONS:         Therapeutic Exercise (75599) FA stretches  5 x 15 sec hold ea FA stretches  5 x 15 sec hold ea         flexbar green   Wrist ext  Sup  15x2        5#    ecc wrist flex   exx pron  15x2       Flexbar    Red, flex twist x20  Bend x20 Red flex x20  Ext x20  Bend x20 Green eccentric flexor work x20  Green ext x20  Green bend x20   [employer], grab and twist x20   Eccentric strength     3# x10    Putty stabs     Red, wide grasp 5' Red, 6'   Therapeutic Activity (50994)                                             Manual Therapy (26745) Hawks  to FA and posterior elbow  10' Hawks  to FA and posterior elbow  10 Flexor mass massage and stretch Flexor mass massage, stretch Flexor mass stretch     K tape to R FA, elbow and posterior elbow to assist flex musculature , assist with pain and relieve ulnar n  pressure Neuromuscular Reeducation (14256)  Ulnar nerve glides  5x 15 sec hold                         ADL Training (62123) Instructed on diagnosis specific anatomy, joint protection, and ADL modifications                          HEP Training/Review See sheet(s)  Forearm stretches       Access Code: [email or web address]   URL: [email or web address]   Date: 12/07/2020   Prepared by: Duyen Torres-Alexandra     Exercises   Standing Wrist Flexion Stretch - 5 reps - 15 hold - 6x daily - 7x weekly   Standing Wrist Extension Stretch - 5 reps - 15 hold - 6x daily - 7x weekly    Added  Ulnar nerve glides   Pt to hold use of CF brace due to pain, use heat at home nightly Try CF brace now that musculature is non-tender              Splinting         Lcode:         Orthotic Mgmt, Subsequent Enc (18414)         Orthotic Mgmt & Training (53534)                  Other:  Explained DN if pt cont with HEP and feels he is not getting better  Issued tensogrip D                                           Therapeutic Exercise & NMR:  [x] (16773) Provided verbal/tactile cueing for activities related to strengthening, flexibility, endurance, ROM  for improvements in scapular, scapulothoracic and UE control with self care, reaching, carrying, lifting, house/yardwork, driving/computer work. [x] (93885) Provided verbal/tactile cueing for activities related to improving balance, coordination, kinesthetic sense, posture, motor skill, proprioception  to assist with  scapular, scapulothoracic and UE control with self care, reaching, carrying, lifting, house/yardwork, driving/computer work.     Therapeutic Activities & NMR:    [] (27797 or 41880) Provided verbal/tactile cueing for activities related to improving balance, coordination, kinesthetic sense, posture, motor skill, proprioception and motor activation to allow for proper function of scapular, scapulothoracic and UE control with self care, carrying, lifting, driving/computer work Home Exercise Program:    [x] (31627) Reviewed/Progressed HEP activities related to strengthening, flexibility, endurance, ROM of scapular, scapulothoracic and UE control with self care, reaching, carrying, lifting, house/yardwork, driving/computer work  [] (82908) Reviewed/Progressed HEP activities related to improving balance, coordination, kinesthetic sense, posture, motor skill, proprioception of scapular, scapulothoracic and UE control with self care, reaching, carrying, lifting, house/yardwork, driving/computer work      Manual Treatments:  PROM / STM / Oscillations-Mobs:  G-I, II, III, IV (PA's, Inf., Post.)  [x] (40552) Provided manual therapy to mobilize soft tissue/joints of cervical/CT, scapular GHJ and UE for the purpose of modulating pain, promoting relaxation,  increasing ROM, reducing/eliminating soft tissue swelling/inflammation/restriction, improving soft tissue extensibility and allowing for proper ROM for normal function with self care, reaching, carrying, lifting, house/yardwork, driving/computer work    ADL Training:  [x] (11349) Provided self-care/home management training related to activities of daily living and compensatory training, and/or use of adaptive equipment      Charges:  Timed Code Treatment Minutes: 38   Total Treatment Minutes: 38   Worker's Comp: Time In/Time Out     [] EVAL (LOW) 98061 (typically 20 minutes face-to-face)    [] EVAL (MOD) 58867 (typically 30 minutes face-to-face)  [] EVAL (HIGH) 02673 (typically 45 minutes face-to-face)  [] OT Re-eval (11507)       [x] Amalia [phone]) x 2     [] SHEAG(83774)  [] NMR (12890) x      [] Estim (attended) (98042)   [] Manual ([id number]) x      [x] PL (08864)  [] TA (34987) x      [] Paraffin (16115)  [] ADL  ([phone]) x     [] Splint/L code:    [] Estim (unattended) (22 095458)  [] Fluidotherapy (62308)  [] Other:      ASSESSMENT: Will monitor for pain-specific positioning or activities.  No pain today despite recent heavy use Frequency/Duration:  1-2 days per week for 4-6 weeks        GOALS:    Patient stated goal:Decrease pain during work and daily tasks  [x]? Progressing: []? Met: []? Not Met: []? Adjusted     Therapist goals for Patient:   Short Term Goals: To be achieved in: 2 weeks  1. Independent in HEP and progression per patient tolerance, in order to prevent re-injury. []? Progressing: [x]? Met: []? Not Met: []? Adjusted   2. Patient will have a decrease in pain to facilitate improvement in movement, function, and ADLs as indicated by Functional Deficits. []? Progressing: [x]? Met: []? Not Met: []? Adjusted     Long Term Goals to be achieved in 4-6 weeks (through 1/18/20 ), including patient directed goals to address patient identified performance deficits:  1) Pt to be independent in graded HEP progression with a good level of effort and compliance. [x]? Progressing: []? Met: []? Not Met: []? Adjusted   2) Pt to report a score of </= 14 % on the Quick DASH disability questionnaire for increased performance with carrying, moving, and handling objects. [x]? Progressing: []? Met: []? Not Met: []? Adjusted   3) Pt will demonstrate increased strength to R  within 10# of L and wrist flex and pron 5-/5 for improved independence with ADL and work tasks. [x]? Progressing: []? Met: []? Not Met: []? Adjusted   4) Pt will have a decrease in pain to 2-3/10 to facilitate performance with ADL and work tasks. .  [x]? Progressing: []? Met: []? Not Met: []? Adjusted            Overall Progression Towards Functional Goals/Treatment Progress Update:  [x] Patient is progressing as expected towards functional goals listed. [] Progression is slowed due to complexities/impairments listed. [] Progression has been slowed due to co-morbidities.   [] Plan just implemented, too soon to assess goals progression <30 days  [] Goals require adjustment due to lack of progress [] Patient is not progressing as expected and requires additional follow up with physician  [] All goals are met  [] Other:     Prognosis for POC: [x] Good [] Fair  [] Poor    Patient requires continued skilled intervention: [x] Yes  [] No    Treatment/Activity Tolerance:  [x] Patient able to complete treatment  [] Patient limited by fatigue  [] Patient limited by pain    [] Patient limited by other medical complications  [] Other:                  PLAN:   [x] Continue per plan of care [] Alter current plan (see comments above)  [] Plan of care initiated [] Hold pending MD visit [] Discharge      Electronically signed by:  Irlanda Villanueva OTR/L, [address] [place] [address]      Note: If patient does not return for scheduled/ recommended follow up visits, this note will serve as a discharge from care along with most recent update on progress.

## 2021-02-22 ENCOUNTER — HOSPITAL ENCOUNTER (OUTPATIENT)
Dept: OCCUPATIONAL THERAPY | Age: 36
Setting detail: THERAPIES SERIES
Discharge: HOME | End: 2021-02-22
Payer: COMMERCIAL

## 2021-02-22 NOTE — FLOWSHEET NOTE
[facility]  [address], [address] [facility], [address] [place] [address]  Phone: [phone] Fax: [phone]      Occupational Therapy  Cancellation/No-show Note  Patient Name:  Rebel Leonard  :  1985   Date:  2021    Cancelled visits to date: 1  No-shows to date: 0    For today's appointment patient:  [x]  Cancelled  []  Rescheduled appointment  []  No-show     Reason given by patient:  []  Patient ill  []  Conflicting appointment  []  No transportation    []  Conflict with work  []  No reason given  [x]  Other: Wants to wait and see doctor at this point for next step     Comments:      Phone call information:   []  Phone call made today to patient at _ time at number provided:      []  Patient answered, conversation as follows:    []  Patient did not answer, message left as follows:  []  Phone call not made today  [x]  Phone call not needed - pt contacted us to cancel and provided reason for cancellation.      Electronically signed by:  Holly Lockhart OTR/L, CHT

## 2023-02-08 NOTE — PROGRESS NOTES
[facility] ENDOSCOPY COLONOSCOPY PRE-OPERATIVE INSTRUCTIONS    Procedure date__2/13/2023_______  Arrival time____0600________        Surgery time___0700_________       Clear liquids the day before the procedure. Do not eat or drink anything within 5 hours of your procedure. This includes water chewing gum, mints and ice chips. You may brush your teeth and gargle the morning of your surgery, but do not swallow the water    You may be asked to stop blood thinners such as Coumadin, Plavix, Fragmin, Lovenox, etc., or any anti-inflammatories such as:  Aspirin, Ibuprofen, Advil, Naproxen prior to your procedure. We also ask that you stop any OTC medications such as fish oil, vitamin E, glucosamine, garlic, Multivitamins, COQ 10, etc.    You must make arrangements for a responsible adult to arrive with you and stay in our waiting area during your procedure. They will also need to take you home after your procedure. For your safety you will not be allowed to leave alone or drive yourself home. Also for your safety, it is strongly suggested that someone stay with you the first 24 hours after your procedure. For your comfort, please wear simple loose fitting clothing to the center. Please do not bring valuables. If you have a living will and a durable power of  for healthcare, please bring in a copy.      You will need to bring a photo ID and insurance card    Our goal is to provide you with excellent care so if you have any questions, please contact us at the [facility] at [phone]         Please note these are generalized instructions for all colonoscopy cases, you may be provided with more specific instructions if necessary

## 2023-02-10 ENCOUNTER — ANESTHESIA EVENT (OUTPATIENT)
Dept: ENDOSCOPY | Age: 38
End: 2023-02-10
Payer: COMMERCIAL

## 2023-02-12 NOTE — ANESTHESIA PRE PROCEDURE
Department of Anesthesiology  Preprocedure Note       Name:  Shine Irwin   Age:  40 y.o.  :  1985                                          MRN:  [id number]         Date:  2023      Surgeon: Cherry Fink):  Josh Garza MD    Procedure: Procedure(s):  COLONOSCOPY DIAGNOSTIC    Medications prior to admission:   Prior to Admission medications    Medication Sig Start Date End Date Taking? Authorizing Provider   gabapentin (NEURONTIN) 100 MG capsule Take 100 mg by mouth daily. Yes Historical Provider, MD   Famotidine (PEPCID PO) Take by mouth    Historical Provider, MD   loperamide (IMODIUM) 2 MG capsule Take 2 mg by mouth 4 times daily as needed for Diarrhea    Historical Provider, MD   cetirizine (ZYRTEC) 10 MG tablet Take 10 mg by mouth daily    Historical Provider, MD   Doxylamine Succinate, Sleep, (UNISOM PO) Take by mouth    Historical Provider, MD   ibuprofen (ADVIL;MOTRIN) 200 MG tablet Take 200 mg by mouth every 6 hours as needed for Pain    Historical Provider, MD       Current medications:    Current Facility-Administered Medications   Medication Dose Route Frequency Provider Last Rate Last Admin    sodium chloride flush 0.9 % injection 5-40 mL  5-40 mL IntraVENous 2 times per day Barbara Moulton MD        sodium chloride flush 0.9 % injection 5-40 mL  5-40 mL IntraVENous PRN Barbara Moulton MD        0.9 % sodium chloride infusion   IntraVENous PRN Barbara Moulton  mL/hr at 23 0652 New Bag at 23 0652    famotidine (PEPCID) injection 20 mg  20 mg IntraVENous Once Diane Milton MD           Allergies:  No Known Allergies    Problem List:  There is no problem list on file for this patient.       Past Medical History:        Diagnosis Date    Acute hemorrhoid     GERD (gastroesophageal reflux disease)     IBS (irritable bowel syndrome)     Prolonged emergence from general anesthesia     Sleep apnea     no cpap       Past Surgical History:        Procedure Laterality Date    COLONOSCOPY  04/27/2017    Dr Indu Edwards GASTROINTESTINAL ENDOSCOPY  04/27/2017    Dr Cristina Ricardo EXTRACTION         Social History:    Social History     Tobacco Use    Smoking status: Never    Smokeless tobacco: Never   Substance Use Topics    Alcohol use: Yes     Alcohol/week: 2.0 standard drinks     Types: 2 Cans of beer per week     Comment: occassional                                Counseling given: Not Answered      Vital Signs (Current):   Vitals:    02/08/23 1025 02/13/23 0646   BP:  (!) 139/99   Pulse:  77   Resp:  16   Temp:  97.3 °F (36.3 °C)   TempSrc:  Temporal   SpO2:  98%   Weight: 195 lb (88.5 kg) 193 lb (87.5 kg)   Height: 6' 1\" (1.854 m) 6' 1\" (1.854 m)                                              BP Readings from Last 3 Encounters:   02/13/23 (!) 139/99   04/27/17 113/83       NPO Status: Time of last liquid consumption: 0530 (sip of water)                        Time of last solid consumption: 2000                        Date of last liquid consumption: 02/13/23                        Date of last solid food consumption: 02/11/23    BMI:   Wt Readings from Last 3 Encounters:   02/13/23 193 lb (87.5 kg)   12/04/20 185 lb (83.9 kg)   04/27/17 174 lb (78.9 kg)     Body mass index is 25.46 kg/m². CBC: No results found for: WBC, RBC, HGB, HCT, MCV, RDW, PLT    CMP: No results found for: NA, K, CL, CO2, BUN, CREATININE, GFRAA, AGRATIO, LABGLOM, GLUCOSE, GLU, PROT, CALCIUM, BILITOT, ALKPHOS, AST, ALT    POC Tests: No results for input(s): POCGLU, POCNA, POCK, POCCL, POCBUN, POCHEMO, POCHCT in the last 72 hours.     Coags: No results found for: PROTIME, INR, APTT    HCG (If Applicable): No results found for: PREGTESTUR, PREGSERUM, HCG, HCGQUANT     ABGs: No results found for: PHART, PO2ART, GDW8YTL, UUQ3RDB, BEART, R1PXRXRW     Type & Screen (If Applicable):  No results found for: LABABO, LABRH    Drug/Infectious Status (If Applicable):  No results found for: HIV, HEPCAB    COVID-19 Screening (If Applicable): No results found for: COVID19        Anesthesia Evaluation     History of anesthetic complications: prolonged emergence  following general anesthesia. Airway: Mallampati: Unable to assess / NA  TM distance: >3 FB     Comment: s/p UPPP  Mouth opening: > = 3 FB   Dental:      Comment: Denies loose teeth    Pulmonary: breath sounds clear to auscultation  (+) sleep apnea:      (-) COPD, asthma, rhonchi, wheezes, rales and not a current smoker                           Cardiovascular:  Exercise tolerance: good (>4 METS),       (-) hypertension, orthopnea,  MUNROE, weak pulses and peripheral edema      Rhythm: regular  Rate: normal                    Neuro/Psych:      (-) seizures, TIA and CVA           GI/Hepatic/Renal:   (+) GERD:, bowel prep (+ rectal bleeding, history of hemorrhoids + polyps (2017)),      (-) liver disease, no renal disease and no morbid obesity       Endo/Other:        (-) diabetes mellitus, blood dyscrasiaElectrolyte problem: last available labs in 2019. Abdominal:         (-) obese Abdomen: soft. Vascular: Other Findings:           Anesthesia Plan      MAC     ASA 2     (NPO appropriate. Famotidine and zofran ordered in preop. )      MIPS: Prophylactic antiemetics administered. Anesthetic plan and risks discussed with patient. This pre-anesthesia assessment may be used as a history and physical.    DOS STAFF ADDENDUM:    Pt seen and examined, chart reviewed (including anesthesia, drug and allergy history). No interval changes to history and physical examination. Anesthetic plan, risks, benefits, alternatives, and personnel involved discussed with patient. Patient verbalized an understanding and agrees to proceed.       Guille Lew MD  February 13, 2023  6:55 AM

## 2023-02-13 ENCOUNTER — ANESTHESIA (OUTPATIENT)
Dept: ENDOSCOPY | Age: 38
End: 2023-02-13
Payer: COMMERCIAL

## 2023-02-13 ENCOUNTER — HOSPITAL ENCOUNTER (OUTPATIENT)
Age: 38
Setting detail: OUTPATIENT SURGERY
Discharge: HOME | End: 2023-02-13
Attending: INTERNAL MEDICINE | Admitting: INTERNAL MEDICINE
Payer: COMMERCIAL

## 2023-02-13 VITALS
BODY MASS INDEX: 25.58 KG/M2 | SYSTOLIC BLOOD PRESSURE: 126 MMHG | OXYGEN SATURATION: 96 % | RESPIRATION RATE: 18 BRPM | HEART RATE: 74 BPM | HEIGHT: 73 IN | WEIGHT: 193 LBS | DIASTOLIC BLOOD PRESSURE: 86 MMHG | TEMPERATURE: 97.5 F

## 2023-02-13 PROCEDURE — 2500000003 HC RX 250 WO HCPCS: Performed by: STUDENT IN AN ORGANIZED HEALTH CARE EDUCATION/TRAINING PROGRAM

## 2023-02-13 PROCEDURE — 7100000010 HC PHASE II RECOVERY - FIRST 15 MIN: Performed by: INTERNAL MEDICINE

## 2023-02-13 PROCEDURE — 6360000002 HC RX W HCPCS: Performed by: STUDENT IN AN ORGANIZED HEALTH CARE EDUCATION/TRAINING PROGRAM

## 2023-02-13 PROCEDURE — 3609027000 HC COLONOSCOPY: Performed by: INTERNAL MEDICINE

## 2023-02-13 PROCEDURE — 3700000001 HC ADD 15 MINUTES (ANESTHESIA): Performed by: INTERNAL MEDICINE

## 2023-02-13 PROCEDURE — 2580000003 HC RX 258: Performed by: ANESTHESIOLOGY

## 2023-02-13 PROCEDURE — 7100000011 HC PHASE II RECOVERY - ADDTL 15 MIN: Performed by: INTERNAL MEDICINE

## 2023-02-13 PROCEDURE — 3700000000 HC ANESTHESIA ATTENDED CARE: Performed by: INTERNAL MEDICINE

## 2023-02-13 RX ORDER — FAMOTIDINE 10 MG/ML
20 INJECTION, SOLUTION INTRAVENOUS ONCE
Status: COMPLETED | OUTPATIENT
Start: 2023-02-13 | End: 2023-02-13

## 2023-02-13 RX ORDER — SODIUM CHLORIDE 0.9 % (FLUSH) 0.9 %
5-40 SYRINGE (ML) INJECTION EVERY 12 HOURS SCHEDULED
Status: DISCONTINUED | OUTPATIENT
Start: 2023-02-13 | End: 2023-02-13 | Stop reason: HOSPADM

## 2023-02-13 RX ORDER — ONDANSETRON 2 MG/ML
4 INJECTION INTRAMUSCULAR; INTRAVENOUS ONCE
Status: COMPLETED | OUTPATIENT
Start: 2023-02-13 | End: 2023-02-13

## 2023-02-13 RX ORDER — SODIUM CHLORIDE 0.9 % (FLUSH) 0.9 %
5-40 SYRINGE (ML) INJECTION PRN
Status: DISCONTINUED | OUTPATIENT
Start: 2023-02-13 | End: 2023-02-13 | Stop reason: HOSPADM

## 2023-02-13 RX ORDER — PROPOFOL 10 MG/ML
INJECTION, EMULSION INTRAVENOUS PRN
Status: DISCONTINUED | OUTPATIENT
Start: 2023-02-13 | End: 2023-02-13 | Stop reason: SDUPTHER

## 2023-02-13 RX ORDER — GABAPENTIN 100 MG/1
100 CAPSULE ORAL DAILY
COMMUNITY

## 2023-02-13 RX ORDER — LIDOCAINE HYDROCHLORIDE 20 MG/ML
INJECTION, SOLUTION EPIDURAL; INFILTRATION; INTRACAUDAL; PERINEURAL PRN
Status: DISCONTINUED | OUTPATIENT
Start: 2023-02-13 | End: 2023-02-13 | Stop reason: SDUPTHER

## 2023-02-13 RX ORDER — SODIUM CHLORIDE 9 MG/ML
INJECTION, SOLUTION INTRAVENOUS PRN
Status: DISCONTINUED | OUTPATIENT
Start: 2023-02-13 | End: 2023-02-13 | Stop reason: HOSPADM

## 2023-02-13 RX ADMIN — SODIUM CHLORIDE: 9 INJECTION, SOLUTION INTRAVENOUS at 06:52

## 2023-02-13 RX ADMIN — FAMOTIDINE 20 MG: 10 INJECTION, SOLUTION INTRAVENOUS at 06:55

## 2023-02-13 RX ADMIN — PROPOFOL 20 MG: 10 INJECTION, EMULSION INTRAVENOUS at 07:16

## 2023-02-13 RX ADMIN — LIDOCAINE HYDROCHLORIDE 80 MG: 20 INJECTION, SOLUTION EPIDURAL; INFILTRATION; INTRACAUDAL; PERINEURAL at 07:07

## 2023-02-13 RX ADMIN — PROPOFOL 20 MG: 10 INJECTION, EMULSION INTRAVENOUS at 07:19

## 2023-02-13 RX ADMIN — PROPOFOL 20 MG: 10 INJECTION, EMULSION INTRAVENOUS at 07:21

## 2023-02-13 RX ADMIN — ONDANSETRON 4 MG: 2 INJECTION INTRAMUSCULAR; INTRAVENOUS at 06:55

## 2023-02-13 RX ADMIN — PROPOFOL 100 MG: 10 INJECTION, EMULSION INTRAVENOUS at 07:08

## 2023-02-13 RX ADMIN — PROPOFOL 60 MG: 10 INJECTION, EMULSION INTRAVENOUS at 07:13

## 2023-02-13 RX ADMIN — PROPOFOL 40 MG: 10 INJECTION, EMULSION INTRAVENOUS at 07:09

## 2023-02-13 RX ADMIN — PROPOFOL 40 MG: 10 INJECTION, EMULSION INTRAVENOUS at 07:11

## 2023-02-13 ASSESSMENT — PAIN - FUNCTIONAL ASSESSMENT: PAIN_FUNCTIONAL_ASSESSMENT: 0-10

## 2023-02-13 ASSESSMENT — LIFESTYLE VARIABLES: SMOKING_STATUS: 0

## 2023-02-13 ASSESSMENT — PAIN SCALES - GENERAL
PAINLEVEL_OUTOF10: 0
PAINLEVEL_OUTOF10: 0

## 2023-02-13 NOTE — DISCHARGE INSTRUCTIONS
[facility] Endoscopy MOB Discharge Instructions  Colonoscopy    NAME:  Yvonne Hallman  YOB: 1985  MEDICAL RECORD NUMBER:  [id number]  DATE:  2/13/2023      After receiving Propofol (Diprivan) for Moderate Sedation:    Do not drive or operate any machinery until tomorrow  Do not sign any legal documents or make any critical decisions  Do not drink alcoholic beverages for 24 hours  Plan to spend a few hours resting before resuming your normal routine  Possible side effects are light headedness and sedation    You may resume your usual diet at home    Resume all your daily medications    Call your physician if any of the following occur:    Severe abdominal distention and/or pain. (Mild distention or cramping is normal after this procedure; this should improve within an hour or two with passage of air)  Fever, chills, nausea or vomiting  You may notice a small amount of blood in your next few bowel movements    If excessive bleeding occurs:  Call your physician immediately or proceed to the nearest Emergency Room    Biopsy Obtained: NO      Recommendations: Follow up with primary MD        For questions or concerns please contact your GI physician's 24 hour call center at [phone].

## 2023-02-13 NOTE — H&P
[facility]   Pre-operative History and Physical    Patient: Casper Amador  : 1985  Acct#: [id number]    History Obtained From: electronic medical record    HISTORY OF PRESENT ILLNESS  Procedure:Colonoscopy  Indications:rectal bleeding  Past Medical History:        Diagnosis Date    Acute hemorrhoid     GERD (gastroesophageal reflux disease)     IBS (irritable bowel syndrome)     Prolonged emergence from general anesthesia     Sleep apnea     no cpap     Past Surgical History:        Procedure Laterality Date    COLONOSCOPY  2017    Dr Dee    EYE SURGERY      lasix    NASAL SEPTUM SURGERY      UPPER GASTROINTESTINAL ENDOSCOPY  2017    Dr Dee    WISDOM TOOTH EXTRACTION       Medications prior to admission:   Prior to Admission medications    Medication Sig Start Date End Date Taking? Authorizing Provider   gabapentin (NEURONTIN) 100 MG capsule Take 100 mg by mouth daily.   Yes Historical Provider, MD   Famotidine (PEPCID PO) Take by mouth    Historical Provider, MD   loperamide (IMODIUM) 2 MG capsule Take 2 mg by mouth 4 times daily as needed for Diarrhea    Historical Provider, MD   cetirizine (ZYRTEC) 10 MG tablet Take 10 mg by mouth daily    Historical Provider, MD   Doxylamine Succinate, Sleep, (UNISOM PO) Take by mouth    Historical Provider, MD   ibuprofen (ADVIL;MOTRIN) 200 MG tablet Take 200 mg by mouth every 6 hours as needed for Pain    Historical Provider, MD     Allergies:   Patient has no known allergies.    Social History     Socioeconomic History    Marital status:      Spouse name: Not on file    Number of children: Not on file    Years of education: Not on file    Highest education level: Not on file   Occupational History    Not on file   Tobacco Use    Smoking status: Never    Smokeless tobacco: Never   Vaping Use    Vaping Use: Never used   Substance and Sexual Activity    Alcohol use: Yes     Alcohol/week: 2.0 standard drinks     Types: 2 Cans of beer per  week     Comment: occassional    Drug use: No    Sexual activity: Not on file   Other Topics Concern    Not on file   Social History Narrative    Not on file     Social Determinants of Health     Financial Resource Strain: Not on file   Food Insecurity: Not on file   Transportation Needs: Not on file   Physical Activity: Not on file   Stress: Not on file   Social Connections: Not on file   Intimate Partner Violence: Not on file   Housing Stability: Not on file     Family History   Problem Relation Age of Onset    Irritable Bowel Syndrome Mother          PHYSICAL EXAM:      BP (!) 139/99   Pulse 77   Temp 97.3 °F (36.3 °C) (Temporal)   Resp 16   Ht 6' 1\" (1.854 m)   Wt 193 lb (87.5 kg)   SpO2 98%   BMI 25.46 kg/m²  I        Heart:normal    Lungs: normal    Abdomen: normal      ASA Grade:  See anesthesia note      ASSESSMENT AND PLAN:    1. Procedure options, risks and benefits reviewed with patient and expresses understanding.

## 2023-02-13 NOTE — ANESTHESIA POSTPROCEDURE EVALUATION
Department of Anesthesiology  Postprocedure Note    Patient: Yanick Orta  MRN: [id number]  YOB: 1985  Date of evaluation: 2/13/2023      Procedure Summary     Date: 02/13/23 Room / Location: [address] [place] [address]    Anesthesia Start: 0700 Anesthesia Stop: 5450    Procedure: COLONOSCOPY DIAGNOSTIC Diagnosis:       Rectal bleed      History of colon polyps      (Rectal bleed, History of colon polyps)    Surgeons: Surendra Mcnamara MD Responsible Provider: Sagrario Torres MD    Anesthesia Type: MAC ASA Status: 2          Anesthesia Type: MAC    Lisandro Phase I: Lisandro Score: 10    Lisandro Phase II: Lisandro Score: 10      Anesthesia Post Evaluation    Patient location during evaluation: PACU  Patient participation: complete - patient participated  Level of consciousness: awake and alert  Airway patency: patent  Nausea & Vomiting: no nausea and no vomiting  Complications: no  Cardiovascular status: hemodynamically stable  Respiratory status: acceptable  Hydration status: stable

## 2023-02-13 NOTE — PROCEDURES
[facility]  Endoscopy Note    Patient: Manisha Melendez  : 1985  Acct#: [de-identified]    Procedure: Colonoscopy     Date:  2023    Surgeon:  Shwetha Calderon MD, MD    Referring Physician:  Griselda Kil    Previous Colonoscopy: Yes  Date: 17  Greater than 3 years? Yes    Preoperative Diagnosis:  rectal bleed    Postoperative Diagnosis:  Internal hemorrhoids. Anesthesia:  See anesthesia note    Indications: This is a 40y.o. year old male who presents today with rectal bleeding. Procedure: An informed consent was obtained from the patient after explanation of indications, benefits, possible risks and complications of the procedure. The patient was then taken to the endoscopy suite, placed in the left lateral decubitus position, and the above IV anesthesia was administered. A digital rectal examination was performed and revealed negative without mass, lesions or tenderness. The Olympus CFQ-180-AL video colonoscope was placed in the patient's rectum under digital direction and advanced to the cecum. The cecum was identified by characteristic anatomy and ballottment. The ileocecal valve was identified. The preparation was excellent. The scope was then withdrawn back through the cecum, ascending, transverse, descending and sigmoid colons. Carefull circumferential examination of the mucosa in these areas demonstrated normal colonic mucosa throughout. The scope was then withdrawn into the rectum and retroflexed. The retroflexed view of the anal verge and rectum demonstrates internal hemorrhoids. The scope was straightened, the colon was decompressed and the scope was withdrawn from the patient. The patient tolerated the procedure well and was taken to the PACU in good condition. Estimated Blood Loss:  none    Impression:  Internal hemorrhoids. Recommendations: Follow up with primary care physician.     Shwetha Calderon MD, MD   [facility]  2023